# Patient Record
Sex: MALE
[De-identification: names, ages, dates, MRNs, and addresses within clinical notes are randomized per-mention and may not be internally consistent; named-entity substitution may affect disease eponyms.]

---

## 2021-01-16 ENCOUNTER — TRANSCRIPTION ENCOUNTER (OUTPATIENT)
Age: 81
End: 2021-01-16

## 2021-02-15 ENCOUNTER — TRANSCRIPTION ENCOUNTER (OUTPATIENT)
Age: 81
End: 2021-02-15

## 2021-05-21 PROBLEM — Z00.00 ENCOUNTER FOR PREVENTIVE HEALTH EXAMINATION: Status: ACTIVE | Noted: 2021-05-21

## 2021-06-07 ENCOUNTER — APPOINTMENT (OUTPATIENT)
Dept: ORTHOPEDIC SURGERY | Facility: CLINIC | Age: 81
End: 2021-06-07
Payer: MEDICARE

## 2021-06-07 PROCEDURE — 20550 NJX 1 TENDON SHEATH/LIGAMENT: CPT | Mod: F8

## 2021-06-07 PROCEDURE — 99203 OFFICE O/P NEW LOW 30 MIN: CPT | Mod: 25

## 2021-06-07 RX ORDER — ENALAPRIL MALEATE 10 MG/1
10 TABLET ORAL
Qty: 90 | Refills: 0 | Status: ACTIVE | COMMUNITY
Start: 2019-12-31

## 2021-06-07 RX ORDER — LISINOPRIL 10 MG/1
10 TABLET ORAL
Qty: 90 | Refills: 0 | Status: ACTIVE | COMMUNITY
Start: 2021-03-17

## 2021-10-15 ENCOUNTER — APPOINTMENT (OUTPATIENT)
Dept: ORTHOPEDIC SURGERY | Facility: CLINIC | Age: 81
End: 2021-10-15

## 2022-08-28 ENCOUNTER — NON-APPOINTMENT (OUTPATIENT)
Age: 82
End: 2022-08-28

## 2023-05-10 ENCOUNTER — APPOINTMENT (OUTPATIENT)
Dept: ORTHOPEDIC SURGERY | Facility: CLINIC | Age: 83
End: 2023-05-10
Payer: MEDICARE

## 2023-05-10 DIAGNOSIS — Z78.9 OTHER SPECIFIED HEALTH STATUS: ICD-10-CM

## 2023-05-10 DIAGNOSIS — M18.11 UNILATERAL PRIMARY OSTEOARTHRITIS OF FIRST CARPOMETACARPAL JOINT, RIGHT HAND: ICD-10-CM

## 2023-05-10 DIAGNOSIS — M65.341 TRIGGER FINGER, RIGHT RING FINGER: ICD-10-CM

## 2023-05-10 DIAGNOSIS — M65.332 TRIGGER FINGER, LEFT MIDDLE FINGER: ICD-10-CM

## 2023-05-10 DIAGNOSIS — M18.12 UNILATERAL PRIMARY OSTEOARTHRITIS OF FIRST CARPOMETACARPAL JOINT, LEFT HAND: ICD-10-CM

## 2023-05-10 DIAGNOSIS — M65.351 TRIGGER FINGER, RIGHT LITTLE FINGER: ICD-10-CM

## 2023-05-10 PROCEDURE — 99214 OFFICE O/P EST MOD 30 MIN: CPT | Mod: 25

## 2023-05-10 PROCEDURE — 20550 NJX 1 TENDON SHEATH/LIGAMENT: CPT | Mod: F2

## 2023-05-13 PROBLEM — M65.341 TRIGGER RING FINGER OF RIGHT HAND: Status: ACTIVE | Noted: 2021-06-07

## 2023-05-13 PROBLEM — M65.351 TRIGGER LITTLE FINGER OF RIGHT HAND: Status: ACTIVE | Noted: 2021-06-08

## 2023-05-13 NOTE — DISCUSSION/SUMMARY
[FreeTextEntry1] : The patient has painful triggering left long finger and pain holding pickle ball paddle as well as holding grocery bags and other similar power gripping activities.  Treatment alternatives risks and complications reviewed and discussed.  Patient states that the cortisone injection for right ring finger resolved triggering and he requests cortisone injection for the left long trigger finger.\par After reviewing risks complications pros and cons patient was treated with Kenalog 20 mg left ring finger flexor sheath without difficulty and was well-tolerated.\par Patient has bilateral basal joint arthritis which is not notably symptomatic and does not require intervention at this time.\par The statistical chances of resolution versus recurrence, and the statistics regarding the possibility of additional injections  were discussed with the patient.\par Prognosis uncertain.\par The following post-injection instructions were given to the patient: The patient should be cautious in activities for two weeks and then increase to full activities.  Thereafter, the patient should return if symptoms continue, or if they justice and recur subsequently. If symptoms do not recur, the patient need not return and can be seen on an as needed basis. The patient has expressed understanding and acceptance of analysis, treatment, and recommendations.  All questions answered.

## 2023-05-13 NOTE — HISTORY OF PRESENT ILLNESS
[FreeTextEntry1] : Patient is 81 yo RHD male,  left hand for pickle ball, who presents for hand evaluation.\par Patient previously seen 6/7/2021 with painful trigger finger right ring finger that was treated with cortisone injection.\par Patient also noted to have painless trigger finger right little finger and bilateral basal joint arthritis with considerable stiffness but no notable symptomatology.\par The patient has not been seen since that single visit.\par \par TODAY:\par Patient returns for hand follow-up evaluation.\par Patient has pain left long finger that interferes with function, doing a pickle ball paddle, grabbing a bag of groceries because the pressure on the palm.\par Symptoms possibly began 6 weeks ago when the patient was in [place] but have become much worse over the past 3 weeks.\par Patient states that he was unwilling to pursue treatment for his hand while he was in [place].\par Patient is aware of locking and triggering of the left long finger.\par \par Patient reports that right ring trigger finger has not recurred.\par Patient reports that basal joint arthritis causes pain from time to time but is not particularly symptomatic.\par Patient reports that right little trigger finger is not problematic.\par

## 2023-08-23 ENCOUNTER — NON-APPOINTMENT (OUTPATIENT)
Age: 83
End: 2023-08-23

## 2025-07-07 ENCOUNTER — APPOINTMENT (OUTPATIENT)
Dept: SURGERY | Facility: CLINIC | Age: 85
End: 2025-07-07
Payer: MEDICARE

## 2025-07-07 VITALS
HEIGHT: 63 IN | TEMPERATURE: 97.5 F | HEART RATE: 73 BPM | SYSTOLIC BLOOD PRESSURE: 157 MMHG | BODY MASS INDEX: 29.23 KG/M2 | DIASTOLIC BLOOD PRESSURE: 78 MMHG | WEIGHT: 165 LBS

## 2025-07-07 PROCEDURE — 99203 OFFICE O/P NEW LOW 30 MIN: CPT

## 2025-07-07 RX ORDER — FINASTERIDE 5 MG/1
5 TABLET, FILM COATED ORAL
Refills: 0 | Status: ACTIVE | COMMUNITY

## 2025-07-07 RX ORDER — ATORVASTATIN CALCIUM 40 MG/1
40 TABLET, FILM COATED ORAL
Refills: 0 | Status: ACTIVE | COMMUNITY